# Patient Record
Sex: MALE | Race: WHITE | NOT HISPANIC OR LATINO | URBAN - METROPOLITAN AREA
[De-identification: names, ages, dates, MRNs, and addresses within clinical notes are randomized per-mention and may not be internally consistent; named-entity substitution may affect disease eponyms.]

---

## 2017-02-22 ENCOUNTER — OFFICE (OUTPATIENT)
Dept: URBAN - METROPOLITAN AREA CLINIC 11 | Facility: CLINIC | Age: 42
End: 2017-02-22

## 2017-02-22 VITALS
WEIGHT: 196 LBS | DIASTOLIC BLOOD PRESSURE: 77 MMHG | SYSTOLIC BLOOD PRESSURE: 124 MMHG | HEIGHT: 72 IN | HEART RATE: 76 BPM

## 2017-02-22 DIAGNOSIS — K62.5 HEMORRHAGE OF ANUS AND RECTUM: ICD-10-CM

## 2017-02-22 PROCEDURE — 99202 OFFICE O/P NEW SF 15 MIN: CPT | Performed by: INTERNAL MEDICINE

## 2017-03-01 LAB
OCCULT BLOOD - SPECIMEN #2: OB2: (no result)
OCCULT BLOOD - SPECIMEN #3: OB3: (no result)
OCCULT BLOOD: OB: (no result)

## 2021-12-09 ENCOUNTER — AMBULATORY SURGICAL CENTER (OUTPATIENT)
Dept: URBAN - METROPOLITAN AREA SURGERY 3 | Facility: SURGERY | Age: 46
End: 2021-12-09
Payer: COMMERCIAL

## 2021-12-09 VITALS
TEMPERATURE: 98.2 F | HEART RATE: 84 BPM | RESPIRATION RATE: 20 BRPM | HEART RATE: 86 BPM | DIASTOLIC BLOOD PRESSURE: 73 MMHG | OXYGEN SATURATION: 100 % | OXYGEN SATURATION: 96 % | SYSTOLIC BLOOD PRESSURE: 129 MMHG | DIASTOLIC BLOOD PRESSURE: 83 MMHG | SYSTOLIC BLOOD PRESSURE: 129 MMHG | SYSTOLIC BLOOD PRESSURE: 128 MMHG | TEMPERATURE: 97.1 F | RESPIRATION RATE: 18 BRPM | HEART RATE: 86 BPM | OXYGEN SATURATION: 98 % | DIASTOLIC BLOOD PRESSURE: 85 MMHG | OXYGEN SATURATION: 100 % | HEART RATE: 88 BPM | HEART RATE: 89 BPM | DIASTOLIC BLOOD PRESSURE: 73 MMHG | SYSTOLIC BLOOD PRESSURE: 133 MMHG | OXYGEN SATURATION: 96 % | WEIGHT: 194 LBS | TEMPERATURE: 98.2 F | DIASTOLIC BLOOD PRESSURE: 87 MMHG | HEART RATE: 88 BPM | RESPIRATION RATE: 19 BRPM | DIASTOLIC BLOOD PRESSURE: 73 MMHG | HEART RATE: 83 BPM | DIASTOLIC BLOOD PRESSURE: 85 MMHG | SYSTOLIC BLOOD PRESSURE: 122 MMHG | TEMPERATURE: 97.1 F | HEART RATE: 83 BPM | RESPIRATION RATE: 19 BRPM | HEART RATE: 88 BPM | SYSTOLIC BLOOD PRESSURE: 133 MMHG | RESPIRATION RATE: 19 BRPM | SYSTOLIC BLOOD PRESSURE: 122 MMHG | HEART RATE: 84 BPM | SYSTOLIC BLOOD PRESSURE: 129 MMHG | DIASTOLIC BLOOD PRESSURE: 83 MMHG | SYSTOLIC BLOOD PRESSURE: 128 MMHG | DIASTOLIC BLOOD PRESSURE: 85 MMHG | DIASTOLIC BLOOD PRESSURE: 83 MMHG | TEMPERATURE: 98.2 F | HEIGHT: 71 IN | TEMPERATURE: 97.1 F | OXYGEN SATURATION: 98 % | DIASTOLIC BLOOD PRESSURE: 87 MMHG | HEART RATE: 89 BPM | WEIGHT: 194 LBS | RESPIRATION RATE: 20 BRPM | HEART RATE: 86 BPM | RESPIRATION RATE: 18 BRPM | HEIGHT: 71 IN | HEIGHT: 71 IN | DIASTOLIC BLOOD PRESSURE: 87 MMHG | RESPIRATION RATE: 18 BRPM | HEART RATE: 84 BPM | OXYGEN SATURATION: 96 % | OXYGEN SATURATION: 100 % | SYSTOLIC BLOOD PRESSURE: 122 MMHG | SYSTOLIC BLOOD PRESSURE: 128 MMHG | RESPIRATION RATE: 20 BRPM | SYSTOLIC BLOOD PRESSURE: 133 MMHG | HEART RATE: 89 BPM | OXYGEN SATURATION: 98 % | WEIGHT: 194 LBS | HEART RATE: 83 BPM

## 2021-12-09 DIAGNOSIS — K57.30 DIVERTICULOSIS OF LARGE INTESTINE WITHOUT PERFORATION OR ABS: ICD-10-CM

## 2021-12-09 DIAGNOSIS — Z12.11 ENCOUNTER FOR SCREENING FOR MALIGNANT NEOPLASM OF COLON: ICD-10-CM

## 2021-12-09 PROCEDURE — G0121 COLON CA SCRN NOT HI RSK IND: HCPCS | Performed by: INTERNAL MEDICINE

## 2021-12-09 NOTE — SERVICEHPINOTES
46 year old male referred by Dr. Alcantara for screening colonoscopy.  He has not had any prior colon evaluation.  There is family history of colon cancer in mother but she was older than 70 at time of diagnosis which does not put our patient at higher risk.  No major illnesses.